# Patient Record
Sex: MALE | Race: WHITE | NOT HISPANIC OR LATINO | URBAN - NONMETROPOLITAN AREA
[De-identification: names, ages, dates, MRNs, and addresses within clinical notes are randomized per-mention and may not be internally consistent; named-entity substitution may affect disease eponyms.]

---

## 2017-08-16 ENCOUNTER — OFFICE VISIT (OUTPATIENT)
Dept: UROLOGY | Facility: CLINIC | Age: 52
End: 2017-08-16

## 2017-08-16 DIAGNOSIS — N52.02 CORPORO-VENOUS OCCLUSIVE ERECTILE DYSFUNCTION: ICD-10-CM

## 2017-08-16 DIAGNOSIS — R79.89 LOW TESTOSTERONE: Primary | ICD-10-CM

## 2017-08-16 LAB
DEPRECATED RDW RBC AUTO: 41.8 FL (ref 37–54)
ERYTHROCYTE [DISTWIDTH] IN BLOOD BY AUTOMATED COUNT: 13.1 % (ref 11.5–14.5)
HCT VFR BLD AUTO: 43.6 % (ref 42–52)
HGB BLD-MCNC: 14.7 G/DL (ref 14–18)
MCH RBC QN AUTO: 29.4 PG (ref 27–33)
MCHC RBC AUTO-ENTMCNC: 33.7 G/DL (ref 33–37)
MCV RBC AUTO: 87.2 FL (ref 80–94)
PLATELET # BLD AUTO: 279 10*3/MM3 (ref 130–400)
PMV BLD AUTO: 10.5 FL (ref 6–10)
PSA SERPL-MCNC: 0.48 NG/ML (ref 0–4)
RBC # BLD AUTO: 5 10*6/MM3 (ref 4.7–6.1)
TESTOST SERPL-MCNC: 204.04 NG/DL (ref 86.98–780.1)
WBC NRBC COR # BLD: 9.81 10*3/MM3 (ref 4.5–12.5)

## 2017-08-16 PROCEDURE — 99214 OFFICE O/P EST MOD 30 MIN: CPT | Performed by: UROLOGY

## 2017-08-16 PROCEDURE — 84153 ASSAY OF PSA TOTAL: CPT | Performed by: UROLOGY

## 2017-08-16 PROCEDURE — 84403 ASSAY OF TOTAL TESTOSTERONE: CPT | Performed by: UROLOGY

## 2017-08-16 PROCEDURE — 36415 COLL VENOUS BLD VENIPUNCTURE: CPT | Performed by: UROLOGY

## 2017-08-16 PROCEDURE — 85027 COMPLETE CBC AUTOMATED: CPT | Performed by: UROLOGY

## 2017-08-16 RX ORDER — TESTOSTERONE CYPIONATE 200 MG/ML
INJECTION, SOLUTION INTRAMUSCULAR
Qty: 10 ML | Refills: 2 | Status: SHIPPED | OUTPATIENT
Start: 2017-08-16 | End: 2017-12-07 | Stop reason: SDUPTHER

## 2017-08-16 RX ORDER — AMLODIPINE BESYLATE 10 MG/1
TABLET ORAL
COMMUNITY
Start: 2017-07-03

## 2017-08-16 RX ORDER — ALLOPURINOL 100 MG/1
TABLET ORAL
COMMUNITY
Start: 2017-07-03

## 2017-08-16 RX ORDER — GLIMEPIRIDE 2 MG/1
TABLET ORAL
COMMUNITY
Start: 2017-07-03

## 2017-08-16 RX ORDER — ATENOLOL 50 MG/1
TABLET ORAL
COMMUNITY
Start: 2017-07-03

## 2017-08-16 RX ORDER — SILDENAFIL CITRATE 20 MG/1
TABLET ORAL
Qty: 24 TABLET | Refills: 11 | Status: SHIPPED | OUTPATIENT
Start: 2017-08-16 | End: 2017-12-07 | Stop reason: SDUPTHER

## 2017-08-16 RX ORDER — LOSARTAN POTASSIUM 100 MG/1
TABLET ORAL
COMMUNITY
Start: 2017-07-03

## 2017-08-16 RX ORDER — LIRAGLUTIDE 6 MG/ML
INJECTION SUBCUTANEOUS
COMMUNITY
Start: 2017-07-03

## 2017-08-16 RX ORDER — SIMVASTATIN 10 MG
TABLET ORAL
COMMUNITY
Start: 2017-07-03

## 2017-08-16 RX ORDER — INSULIN DETEMIR 100 [IU]/ML
INJECTION, SOLUTION SUBCUTANEOUS
COMMUNITY
Start: 2017-07-11

## 2017-08-16 NOTE — PROGRESS NOTES
Chief Complaint:          Chief Complaint   Patient presents with   • Hypogonadism       HPI:   51 y.o. male.    51-year-old white male with a history of low testosterone and erectile dysfunction and uses testosterone and generic sildenafil.  He's doing great.Low testosterone-patient is here for follow-up.  Since beginning the medication he's been very pleased.  He reports a dramatic improvement in his erections, ability to achieve and maintain an erection, improvement in libido, increase in frequency of morning erections, a noticeable weight loss consistent with the treatment.  No development of breast problems or abnormalities.  He's going to have appropriate safety laboratory parameters checked.   He understands that the new data implicates testosterone with the development of prostate cancer and this is all but been disproven and the medical literature as well as the risks of cardiovascular disease which is actually also been disproven.  He understands that while he is a candidate for topical therapy if he is in contact with children this is not an option because it's been shown to accentuate genitalia development at an early age that this frequently irreversible.  He also understands this is a controlled substance and as such will not be prescribed without appropriate follow-up and appropriate laboratory investigation.  He understands effects on spermatogenesis including the fact that this is not always completely reversible and not always completely limited his ability to father a child.  He has demonstrated facility in the technique of both intramuscular and subcutaneous injection.  And has been taught sterility one drawing up the medication.      Past Medical History:        Past Medical History:   Diagnosis Date   • Diabetes mellitus    • Hypertension    • Hypogonadism in male          Current Meds:     Current Outpatient Prescriptions   Medication Sig Dispense Refill   • allopurinol (ZYLOPRIM) 100 MG  tablet      • amLODIPine (NORVASC) 10 MG tablet      • atenolol (TENORMIN) 50 MG tablet      • glimepiride (AMARYL) 2 MG tablet      • LEVEMIR FLEXTOUCH 100 UNIT/ML injection      • losartan (COZAAR) 100 MG tablet      • metFORMIN (GLUCOPHAGE) 1000 MG tablet      • NOVOFINE 32G X 6 MM misc      • simvastatin (ZOCOR) 10 MG tablet      • VICTOZA 18 MG/3ML solution pen-injector        No current facility-administered medications for this visit.         Allergies:      Allergies   Allergen Reactions   • Penicillins         Past Surgical History:     Past Surgical History:   Procedure Laterality Date   • HERNIA REPAIR           Social History:     Social History     Social History   • Marital status: Unknown     Spouse name: N/A   • Number of children: N/A   • Years of education: N/A     Occupational History   • Not on file.     Social History Main Topics   • Smoking status: Never Smoker   • Smokeless tobacco: Not on file   • Alcohol use Yes   • Drug use: No   • Sexual activity: Not on file     Other Topics Concern   • Not on file     Social History Narrative   • No narrative on file       Family History:     Family History   Problem Relation Age of Onset   • No Known Problems Father    • No Known Problems Mother        Review of Systems:     Review of Systems   Constitutional: Negative.    HENT: Negative.    Eyes: Negative.    Respiratory: Negative.    Cardiovascular: Negative.    Gastrointestinal: Negative.    Endocrine: Negative.    Musculoskeletal: Negative.    Allergic/Immunologic: Negative.    Neurological: Negative.    Hematological: Negative.    Psychiatric/Behavioral: Negative.        Physical Exam:     Physical Exam   Constitutional: He is oriented to person, place, and time. He appears well-developed and well-nourished.   HENT:   Head: Normocephalic and atraumatic.   Eyes: Conjunctivae and EOM are normal. Pupils are equal, round, and reactive to light.   Neck: Normal range of motion.   Cardiovascular: Normal  rate, regular rhythm, normal heart sounds and intact distal pulses.    Pulmonary/Chest: Effort normal and breath sounds normal.   Abdominal: Soft. Bowel sounds are normal.   Genitourinary: Rectum normal, prostate normal and penis normal.   Genitourinary Comments: Soft nontender abdomen with no organomegaly, rigidity, or tenderness.  He has normal external genitalia and uncircumcised phallus with a freely movable foreskin bilaterally descended testes without masses there is no inguinal hernias adenopathy or abnormalities he had good rectal tone and a large smooth firm prostate.  There is no nodularity or any suspicious rectal abnormalities     Musculoskeletal: Normal range of motion.   Neurological: He is alert and oriented to person, place, and time. He has normal reflexes.   Skin: Skin is warm and dry.   Psychiatric: He has a normal mood and affect. His behavior is normal. Judgment and thought content normal.   Nursing note and vitals reviewed.      Procedure:       Assessment:   No diagnosis found.  No orders of the defined types were placed in this encounter.      Plan:   Low testosterone-patient is here for follow-up.  Since beginning the medication he's been very pleased.  He reports a dramatic improvement in his erections, ability to achieve and maintain an erection, improvement in libido, increase in frequency of morning erections, a noticeable weight loss consistent with the treatment.  No development of breast problems or abnormalities.  He's going to have appropriate safety laboratory parameters checked.   He understands that the new data implicates testosterone with the development of prostate cancer and this is all but been disproven and the medical literature as well as the risks of cardiovascular disease which is actually also been disproven.  He understands that while he is a candidate for topical therapy if he is in contact with children this is not an option because it's been shown to accentuate  genitalia development at an early age that this frequently irreversible.  He also understands this is a controlled substance and as such will not be prescribed without appropriate follow-up and appropriate laboratory investigation.  He understands effects on spermatogenesis including the fact that this is not always completely reversible and not always completely limited his ability to father a child.  He has demonstrated facility in the technique of both intramuscular and subcutaneous injection.  And has been taught sterility one drawing up the medication.  ED-we discussed the anatomy and physiology of the penis and the endothelium.  We discussed the various treatment options available including oral medication and its various forms.  Penile injections, vacuum erection device and surgical intervention reserved for only the most severe cases.  We discussed the need for testosterone and about 20% of cases of erectile dysfunction.  I refilled his generic sildenafil           This document has been electronically signed by MIA MARES MD August 16, 2017 3:58 PM

## 2017-08-17 PROBLEM — N52.02 CORPORO-VENOUS OCCLUSIVE ERECTILE DYSFUNCTION: Status: ACTIVE | Noted: 2017-08-17

## 2017-08-17 PROBLEM — R79.89 LOW TESTOSTERONE: Status: ACTIVE | Noted: 2017-08-17

## 2017-12-07 ENCOUNTER — OFFICE VISIT (OUTPATIENT)
Dept: UROLOGY | Facility: CLINIC | Age: 52
End: 2017-12-07

## 2017-12-07 VITALS — BODY MASS INDEX: 37.89 KG/M2 | HEIGHT: 68 IN | WEIGHT: 250 LBS

## 2017-12-07 DIAGNOSIS — R79.89 LOW TESTOSTERONE: ICD-10-CM

## 2017-12-07 DIAGNOSIS — N42.9 DISORDER OF PROSTATE: Primary | ICD-10-CM

## 2017-12-07 LAB
DEPRECATED RDW RBC AUTO: 40.2 FL (ref 37–54)
ERYTHROCYTE [DISTWIDTH] IN BLOOD BY AUTOMATED COUNT: 12.7 % (ref 11.5–14.5)
HCT VFR BLD AUTO: 45.6 % (ref 42–52)
HGB BLD-MCNC: 15.1 G/DL (ref 14–18)
MCH RBC QN AUTO: 28.9 PG (ref 27–33)
MCHC RBC AUTO-ENTMCNC: 33.1 G/DL (ref 33–37)
MCV RBC AUTO: 87.4 FL (ref 80–94)
PLATELET # BLD AUTO: 310 10*3/MM3 (ref 130–400)
PMV BLD AUTO: 10.8 FL (ref 6–10)
PSA SERPL-MCNC: 0.57 NG/ML (ref 0–4)
RBC # BLD AUTO: 5.22 10*6/MM3 (ref 4.7–6.1)
TESTOST SERPL-MCNC: 884.81 NG/DL (ref 86.98–780.1)
WBC NRBC COR # BLD: 8.88 10*3/MM3 (ref 4.5–12.5)

## 2017-12-07 PROCEDURE — 84403 ASSAY OF TOTAL TESTOSTERONE: CPT | Performed by: UROLOGY

## 2017-12-07 PROCEDURE — 99213 OFFICE O/P EST LOW 20 MIN: CPT | Performed by: UROLOGY

## 2017-12-07 PROCEDURE — 85027 COMPLETE CBC AUTOMATED: CPT | Performed by: UROLOGY

## 2017-12-07 PROCEDURE — 84153 ASSAY OF PSA TOTAL: CPT | Performed by: UROLOGY

## 2017-12-07 RX ORDER — SILDENAFIL CITRATE 20 MG/1
TABLET ORAL
Qty: 60 TABLET | Refills: 11 | Status: SHIPPED | OUTPATIENT
Start: 2017-12-07 | End: 2018-06-21 | Stop reason: SDUPTHER

## 2017-12-07 RX ORDER — TESTOSTERONE CYPIONATE 200 MG/ML
INJECTION, SOLUTION INTRAMUSCULAR
Qty: 10 ML | Refills: 2 | Status: SHIPPED | OUTPATIENT
Start: 2017-12-07 | End: 2018-06-21 | Stop reason: SDUPTHER

## 2017-12-08 NOTE — PROGRESS NOTES
"Chief Complaint:          Chief Complaint   Patient presents with   • Hypogonadism       HPI:   52 y.o. male.  Patient returns today for follow-up.  He is been on testosterone replacement therapy.  He reports a dramatic improvement in his Yvan questionnaire: -YVAN-androgen deficiency in the age male questionnaire  The patient was queried regarding the androgen deficiency in the age male questionnaire.  This is a validated questionnaire that was performed on a set of 314 [place] male physicians when it was positive it correlated directly with a 94% chance of low testosterone.  Patient indicates there is a decrease in libido or sex drive, a lack of energy, Decreased  strength and endurance, a decreased \"enjoyment of life\", sad and grumpy feelings with significant difficulty maintaining erections.  He is also been a recent deterioration regarding work performance.  He reports weight loss.  He has good facility and the use of subcutaneous and intramuscular injections as well as comfort level and using the medication in a sterile fashion.  He understands he should use only the prescribed dose.  He's here for appropriate lab monitoring regarding this.  He understands this is a controlled substance and therefore must be watched closely will not be refilled and the medical loss or miss calculation of the dose.  He is very happy with the treatment and therefore wants to continue it.    Past Medical History:        Past Medical History:   Diagnosis Date   • Diabetes mellitus    • Hypertension    • Hypogonadism in male          Current Meds:     Current Outpatient Prescriptions   Medication Sig Dispense Refill   • allopurinol (ZYLOPRIM) 100 MG tablet      • amLODIPine (NORVASC) 10 MG tablet      • atenolol (TENORMIN) 50 MG tablet      • glimepiride (AMARYL) 2 MG tablet      • LEVEMIR FLEXTOUCH 100 UNIT/ML injection      • losartan (COZAAR) 100 MG tablet      • metFORMIN (GLUCOPHAGE) 1000 MG tablet      • NOVOFINE 32G X 6 MM " misc      • sildenafil (REVATIO) 20 MG tablet 1-5 by mouth one hour prior to intercourse on an empty stomach 60 tablet 11   • simvastatin (ZOCOR) 10 MG tablet      • Syringe, Disposable, 3 ML misc Use 3 ml syringe with a 25 gauge  5/8 inch needle 24 each 6   • Testosterone Cypionate (DEPO-TESTOSTERONE) 200 MG/ML injection He is to use 1/2 cc every Monday and Thursday SQ 10 mL 2   • VICTOZA 18 MG/3ML solution pen-injector        No current facility-administered medications for this visit.         Allergies:      Allergies   Allergen Reactions   • Penicillins         Past Surgical History:     Past Surgical History:   Procedure Laterality Date   • HERNIA REPAIR           Social History:     Social History     Social History   • Marital status: Unknown     Spouse name: N/A   • Number of children: N/A   • Years of education: N/A     Occupational History   • Not on file.     Social History Main Topics   • Smoking status: Never Smoker   • Smokeless tobacco: Not on file   • Alcohol use Yes   • Drug use: No   • Sexual activity: Not on file     Other Topics Concern   • Not on file     Social History Narrative       Family History:     Family History   Problem Relation Age of Onset   • No Known Problems Father    • No Known Problems Mother        Review of Systems:     Review of Systems   Constitutional: Negative.    HENT: Negative.    Eyes: Negative.    Respiratory: Negative.    Cardiovascular: Negative.    Gastrointestinal: Negative.    Endocrine: Negative.    Musculoskeletal: Negative.    Allergic/Immunologic: Negative.    Neurological: Negative.    Hematological: Negative.    Psychiatric/Behavioral: Negative.        Physical Exam:     Physical Exam   Constitutional: He is oriented to person, place, and time. He appears well-developed and well-nourished.   HENT:   Head: Normocephalic and atraumatic.   Eyes: Conjunctivae and EOM are normal. Pupils are equal, round, and reactive to light.   Neck: Normal range of motion.    Cardiovascular: Normal rate, regular rhythm, normal heart sounds and intact distal pulses.    Pulmonary/Chest: Effort normal and breath sounds normal.   Abdominal: Soft. Bowel sounds are normal.   Genitourinary: Rectum normal, prostate normal and penis normal.   Musculoskeletal: Normal range of motion.   Neurological: He is alert and oriented to person, place, and time. He has normal reflexes.   Skin: Skin is warm and dry.   Psychiatric: He has a normal mood and affect. His behavior is normal. Judgment and thought content normal.   Nursing note and vitals reviewed.      Procedure:       Assessment:     Encounter Diagnoses   Name Primary?   • Low testosterone    • Disorder of prostate Yes     Orders Placed This Encounter   Procedures   • CBC (No Diff)   • PSA   • Testosterone       Plan:   -Low testosterone: patient is here for follow-up.  Since beginning the medication he's been very pleased.  He reports a dramatic improvement in his erections, ability to achieve and maintain an erection, improvement in libido, increase in frequency of morning erections, a noticeable weight loss consistent with the treatment.  No development of breast problems or abnormalities.  He's going to have appropriate safety laboratory parameters checked.   He understands that the new data implicates testosterone with the development of prostate cancer and this is all but been disproven and the medical literature as well as the risks of cardiovascular disease which is actually also been disproven.  He understands that while he is a candidate for topical therapy if he is in contact with children this is not an option because it's been shown to accentuate genitalia development at an early age that this frequently irreversible.  He also understands this is a controlled substance and as such will not be prescribed without appropriate follow-up and appropriate laboratory investigation.  He understands effects on spermatogenesis including the  fact that this is not always completely reversible and not always completely limited his ability to father a child.  He has demonstrated facility in the technique of both intramuscular and subcutaneous injection.  And has been taught sterility one drawing up the medication.           This document has been electronically signed by MIA MARES MD December 8, 2017 9:29 AM

## 2018-06-21 ENCOUNTER — OFFICE VISIT (OUTPATIENT)
Dept: UROLOGY | Facility: CLINIC | Age: 53
End: 2018-06-21

## 2018-06-21 VITALS — WEIGHT: 250 LBS | HEIGHT: 68 IN | BODY MASS INDEX: 37.89 KG/M2

## 2018-06-21 DIAGNOSIS — R79.89 LOW TESTOSTERONE: ICD-10-CM

## 2018-06-21 DIAGNOSIS — N52.02 CORPORO-VENOUS OCCLUSIVE ERECTILE DYSFUNCTION: ICD-10-CM

## 2018-06-21 DIAGNOSIS — E29.1 HYPOGONADISM IN MALE: Primary | ICD-10-CM

## 2018-06-21 LAB
BASOPHILS # BLD AUTO: 0.09 10*3/MM3 (ref 0–0.3)
BASOPHILS NFR BLD AUTO: 1 % (ref 0–2)
DEPRECATED RDW RBC AUTO: 39.2 FL (ref 37–54)
EOSINOPHIL # BLD AUTO: 0.24 10*3/MM3 (ref 0–0.7)
EOSINOPHIL NFR BLD AUTO: 2.7 % (ref 0–5)
ERYTHROCYTE [DISTWIDTH] IN BLOOD BY AUTOMATED COUNT: 12.8 % (ref 11.5–14.5)
HCT VFR BLD AUTO: 42.6 % (ref 42–52)
HGB BLD-MCNC: 15.3 G/DL (ref 14–18)
IMM GRANULOCYTES # BLD: 0.02 10*3/MM3 (ref 0–0.03)
IMM GRANULOCYTES NFR BLD: 0.2 % (ref 0–0.5)
LYMPHOCYTES # BLD AUTO: 2.82 10*3/MM3 (ref 1–3)
LYMPHOCYTES NFR BLD AUTO: 32.2 % (ref 21–51)
MCH RBC QN AUTO: 30.3 PG (ref 27–33)
MCHC RBC AUTO-ENTMCNC: 35.9 G/DL (ref 33–37)
MCV RBC AUTO: 84.4 FL (ref 80–94)
MONOCYTES # BLD AUTO: 1.1 10*3/MM3 (ref 0.1–0.9)
MONOCYTES NFR BLD AUTO: 12.6 % (ref 0–10)
NEUTROPHILS # BLD AUTO: 4.48 10*3/MM3 (ref 1.4–6.5)
NEUTROPHILS NFR BLD AUTO: 51.3 % (ref 30–70)
PLATELET # BLD AUTO: 248 10*3/MM3 (ref 130–400)
PMV BLD AUTO: 10.5 FL (ref 6–10)
PSA SERPL-MCNC: 0.4 NG/ML (ref 0–4)
RBC # BLD AUTO: 5.05 10*6/MM3 (ref 4.7–6.1)
TESTOST SERPL-MCNC: 177.88 NG/DL (ref 86.98–780.1)
WBC NRBC COR # BLD: 8.75 10*3/MM3 (ref 4.5–12.5)

## 2018-06-21 PROCEDURE — 36415 COLL VENOUS BLD VENIPUNCTURE: CPT | Performed by: UROLOGY

## 2018-06-21 PROCEDURE — 99213 OFFICE O/P EST LOW 20 MIN: CPT | Performed by: UROLOGY

## 2018-06-21 PROCEDURE — 84153 ASSAY OF PSA TOTAL: CPT | Performed by: UROLOGY

## 2018-06-21 PROCEDURE — 85025 COMPLETE CBC W/AUTO DIFF WBC: CPT | Performed by: UROLOGY

## 2018-06-21 PROCEDURE — 84403 ASSAY OF TOTAL TESTOSTERONE: CPT | Performed by: UROLOGY

## 2018-06-21 RX ORDER — SILDENAFIL CITRATE 20 MG/1
TABLET ORAL
Qty: 60 TABLET | Refills: 11 | Status: SHIPPED | OUTPATIENT
Start: 2018-06-21 | End: 2019-01-10 | Stop reason: SDUPTHER

## 2018-06-21 RX ORDER — TESTOSTERONE CYPIONATE 200 MG/ML
INJECTION, SOLUTION INTRAMUSCULAR
Qty: 10 ML | Refills: 2 | Status: SHIPPED | OUTPATIENT
Start: 2018-06-21 | End: 2019-01-10 | Stop reason: SDUPTHER

## 2018-06-21 NOTE — PROGRESS NOTES
"Chief Complaint:          Chief Complaint   Patient presents with   • Hypogonadism       HPI:   52 y.o. male.  52-year-old white male who is very happy with the treatment.  He has good erections and ejaculations.  He's lost 15 pounds.  He would like to continue the therapy. Patient returns today for follow-up.  He is been on testosterone replacement therapy.  He reports a dramatic improvement in his Yvan questionnaire: -YVAN-androgen deficiency in the age male questionnaire  The patient was queried regarding the androgen deficiency in the age male questionnaire.  This is a validated questionnaire that was performed on a set of 314 [place] male physicians when it was positive it correlated directly with a 94% chance of low testosterone.  Patient indicates there is a decrease in libido or sex drive, a lack of energy, Decreased  strength and endurance, a decreased \"enjoyment of life\", sad and grumpy feelings with significant difficulty maintaining erections.  He is also been a recent deterioration regarding work performance.  He reports weight loss.  He has good facility and the use of subcutaneous and intramuscular injections as well as comfort level and using the medication in a sterile fashion.  He understands he should use only the prescribed dose.  He's here for appropriate lab monitoring regarding this.  He understands this is a controlled substance and therefore must be watched closely will not be refilled and the medical loss or miss calculation of the dose.  He is very happy with the treatment and therefore wants to continue it.    Past Medical History:        Past Medical History:   Diagnosis Date   • Diabetes mellitus    • Hypertension    • Hypogonadism in male          Current Meds:     Current Outpatient Prescriptions   Medication Sig Dispense Refill   • allopurinol (ZYLOPRIM) 100 MG tablet      • amLODIPine (NORVASC) 10 MG tablet      • atenolol (TENORMIN) 50 MG tablet      • glimepiride (AMARYL) 2 MG " tablet      • LEVEMIR FLEXTOUCH 100 UNIT/ML injection      • losartan (COZAAR) 100 MG tablet      • metFORMIN (GLUCOPHAGE) 1000 MG tablet      • NOVOFINE 32G X 6 MM misc      • sildenafil (REVATIO) 20 MG tablet 1-5 by mouth one hour prior to intercourse on an empty stomach 60 tablet 11   • simvastatin (ZOCOR) 10 MG tablet      • Syringe, Disposable, 3 ML misc Use 3 ml syringe with a 25 gauge  5/8 inch needle 24 each 6   • Testosterone Cypionate (DEPO-TESTOSTERONE) 200 MG/ML injection He is to use 1/2 cc every Monday and Thursday SQ 10 mL 2   • VICTOZA 18 MG/3ML solution pen-injector        No current facility-administered medications for this visit.         Allergies:      Allergies   Allergen Reactions   • Penicillins         Past Surgical History:     Past Surgical History:   Procedure Laterality Date   • HERNIA REPAIR           Social History:     Social History     Social History   • Marital status: Unknown     Spouse name: N/A   • Number of children: N/A   • Years of education: N/A     Occupational History   • Not on file.     Social History Main Topics   • Smoking status: Never Smoker   • Smokeless tobacco: Never Used   • Alcohol use Yes   • Drug use: No   • Sexual activity: Not on file     Other Topics Concern   • Not on file     Social History Narrative   • No narrative on file       Family History:     Family History   Problem Relation Age of Onset   • No Known Problems Father    • No Known Problems Mother        Review of Systems:     Review of Systems   Constitutional: Negative.    HENT: Negative.    Eyes: Negative.    Respiratory: Negative.    Cardiovascular: Negative.    Gastrointestinal: Negative.    Endocrine: Negative.    Musculoskeletal: Negative.    Allergic/Immunologic: Negative.    Neurological: Negative.    Hematological: Negative.    Psychiatric/Behavioral: Negative.        Physical Exam:     Physical Exam   Constitutional: He is oriented to person, place, and time. He appears well-developed  and well-nourished.   HENT:   Head: Normocephalic and atraumatic.   Eyes: Conjunctivae and EOM are normal. Pupils are equal, round, and reactive to light.   Neck: Normal range of motion.   Cardiovascular: Normal rate, regular rhythm, normal heart sounds and intact distal pulses.    Pulmonary/Chest: Effort normal and breath sounds normal.   Abdominal: Soft. Bowel sounds are normal.   Musculoskeletal: Normal range of motion.   Neurological: He is alert and oriented to person, place, and time. He has normal reflexes.   Skin: Skin is warm and dry.   Psychiatric: He has a normal mood and affect. His behavior is normal. Judgment and thought content normal.   Nursing note and vitals reviewed.      I have reviewed the following portions of the patient's history: allergies, current medications, past family history, past medical history, past social history, past surgical history, problem list and ROS and confirm it's accurate.    Procedure:       Assessment/Plan:   -Low testosterone: patient is here for follow-up.  Since beginning the medication he's been very pleased.  He reports a dramatic improvement in his erections, ability to achieve and maintain an erection, improvement in libido, increase in frequency of morning erections, a noticeable weight loss consistent with the treatment.  No development of breast problems or abnormalities.  He's going to have appropriate safety laboratory parameters checked.   He understands that the new data implicates testosterone with the development of prostate cancer and this is all but been disproven and the medical literature as well as the risks of cardiovascular disease which is actually also been disproven.  He understands that while he is a candidate for topical therapy if he is in contact with children this is not an option because it's been shown to accentuate genitalia development at an early age that this frequently irreversible.  He also understands this is a controlled  substance and as such will not be prescribed without appropriate follow-up and appropriate laboratory investigation.  He understands effects on spermatogenesis including the fact that this is not always completely reversible and not always completely limited his ability to father a child.  He has demonstrated facility in the technique of both intramuscular and subcutaneous injection.  And has been taught sterility one drawing up the medication.     Patient's Body mass index is 38.02 kg/m². BMI is above normal parameters. Recommendations include: educational material.          This document has been electronically signed by MIA MARES MD June 21, 2018 4:05 PM

## 2018-06-22 ENCOUNTER — TELEPHONE (OUTPATIENT)
Dept: UROLOGY | Facility: CLINIC | Age: 53
End: 2018-06-22

## 2018-06-22 NOTE — TELEPHONE ENCOUNTER
I called the patient to let him know that we got the results of his testosterone levels and Dr. Arevalo said it look great!!

## 2019-01-10 ENCOUNTER — OFFICE VISIT (OUTPATIENT)
Dept: UROLOGY | Facility: CLINIC | Age: 54
End: 2019-01-10

## 2019-01-10 VITALS — WEIGHT: 250 LBS | HEIGHT: 68 IN | BODY MASS INDEX: 37.89 KG/M2

## 2019-01-10 DIAGNOSIS — N52.02 CORPORO-VENOUS OCCLUSIVE ERECTILE DYSFUNCTION: ICD-10-CM

## 2019-01-10 DIAGNOSIS — R79.89 LOW TESTOSTERONE: ICD-10-CM

## 2019-01-10 DIAGNOSIS — E29.1 HYPOGONADISM IN MALE: Primary | ICD-10-CM

## 2019-01-10 LAB
DEPRECATED RDW RBC AUTO: 41 FL (ref 37–54)
ERYTHROCYTE [DISTWIDTH] IN BLOOD BY AUTOMATED COUNT: 13.2 % (ref 11.5–14.5)
HCT VFR BLD AUTO: 44.2 % (ref 42–52)
HGB BLD-MCNC: 15.1 G/DL (ref 14–18)
MCH RBC QN AUTO: 29.8 PG (ref 27–33)
MCHC RBC AUTO-ENTMCNC: 34.2 G/DL (ref 33–37)
MCV RBC AUTO: 87.2 FL (ref 80–94)
PLATELET # BLD AUTO: 318 10*3/MM3 (ref 130–400)
PMV BLD AUTO: 10.3 FL (ref 6–10)
PSA SERPL-MCNC: 0.9 NG/ML (ref 0–4)
RBC # BLD AUTO: 5.07 10*6/MM3 (ref 4.7–6.1)
TESTOST SERPL-MCNC: 553.57 NG/DL (ref 86.98–780.1)
WBC NRBC COR # BLD: 11.48 10*3/MM3 (ref 4.5–12.5)

## 2019-01-10 PROCEDURE — 85027 COMPLETE CBC AUTOMATED: CPT | Performed by: UROLOGY

## 2019-01-10 PROCEDURE — 99213 OFFICE O/P EST LOW 20 MIN: CPT | Performed by: UROLOGY

## 2019-01-10 PROCEDURE — 84403 ASSAY OF TOTAL TESTOSTERONE: CPT | Performed by: UROLOGY

## 2019-01-10 PROCEDURE — 84153 ASSAY OF PSA TOTAL: CPT | Performed by: UROLOGY

## 2019-01-10 PROCEDURE — 36415 COLL VENOUS BLD VENIPUNCTURE: CPT | Performed by: UROLOGY

## 2019-01-10 RX ORDER — TESTOSTERONE CYPIONATE 200 MG/ML
INJECTION, SOLUTION INTRAMUSCULAR
Qty: 10 ML | Refills: 2 | Status: SHIPPED | OUTPATIENT
Start: 2019-01-10 | End: 2019-08-29 | Stop reason: SDUPTHER

## 2019-01-10 RX ORDER — SILDENAFIL CITRATE 20 MG/1
TABLET ORAL
Qty: 60 TABLET | Refills: 11 | Status: SHIPPED | OUTPATIENT
Start: 2019-01-10 | End: 2019-08-29 | Stop reason: SDUPTHER

## 2019-01-10 NOTE — PROGRESS NOTES
"Chief Complaint:          Chief Complaint   Patient presents with   • Hypogonadism       HPI:   53 y.o. male.  53-year-old white male returns today's doing fantastic on testosterone I thought his face looks a little red and I'm to work him up for polycythemia. Patient returns today for follow-up.  He is been on testosterone replacement therapy.  He reports a dramatic improvement in his Yvan questionnaire: -YVAN-androgen deficiency in the age male questionnaire  The patient was queried regarding the androgen deficiency in the age male questionnaire.  This is a validated questionnaire that was performed on a set of 314 [place] male physicians when it was positive it correlated directly with a 94% chance of low testosterone.  Patient indicates there is a decrease in libido or sex drive, a lack of energy, Decreased  strength and endurance, a decreased \"enjoyment of life\", sad and grumpy feelings with significant difficulty maintaining erections.  He is also been a recent deterioration regarding work performance.  He reports weight loss.  He has good facility and the use of subcutaneous and intramuscular injections as well as comfort level and using the medication in a sterile fashion.  He understands he should use only the prescribed dose.  He's here for appropriate lab monitoring regarding this.  He understands this is a controlled substance and therefore must be watched closely will not be refilled and the medical loss or miss calculation of the dose.  He is very happy with the treatment and therefore wants to continue it.    Past Medical History:        Past Medical History:   Diagnosis Date   • Diabetes mellitus (CMS/[facility])    • Hypertension    • Hypogonadism in male          Current Meds:     Current Outpatient Medications   Medication Sig Dispense Refill   • allopurinol (ZYLOPRIM) 100 MG tablet      • amLODIPine (NORVASC) 10 MG tablet      • atenolol (TENORMIN) 50 MG tablet      • glimepiride (AMARYL) 2 MG tablet   "    • LEVEMIR FLEXTOUCH 100 UNIT/ML injection      • losartan (COZAAR) 100 MG tablet      • metFORMIN (GLUCOPHAGE) 1000 MG tablet      • NOVOFINE 32G X 6 MM misc      • sildenafil (REVATIO) 20 MG tablet 1-5 by mouth one hour prior to intercourse on an empty stomach 60 tablet 11   • simvastatin (ZOCOR) 10 MG tablet      • Syringe, Disposable, 3 ML misc Use 3 ml syringe with a 25 gauge  5/8 inch needle 24 each 6   • Testosterone Cypionate (DEPO-TESTOSTERONE) 200 MG/ML injection He is to use 1/2 cc every Monday and Thursday SQ 10 mL 2   • VICTOZA 18 MG/3ML solution pen-injector        No current facility-administered medications for this visit.         Allergies:      Allergies   Allergen Reactions   • Penicillins         Past Surgical History:     Past Surgical History:   Procedure Laterality Date   • HERNIA REPAIR           Social History:     Social History     Socioeconomic History   • Marital status: Unknown     Spouse name: Not on file   • Number of children: Not on file   • Years of education: Not on file   • Highest education level: Not on file   Social Needs   • Financial resource strain: Not on file   • Food insecurity - worry: Not on file   • Food insecurity - inability: Not on file   • Transportation needs - medical: Not on file   • Transportation needs - non-medical: Not on file   Occupational History   • Not on file   Tobacco Use   • Smoking status: Never Smoker   • Smokeless tobacco: Never Used   Substance and Sexual Activity   • Alcohol use: Yes   • Drug use: No   • Sexual activity: Not on file   Other Topics Concern   • Not on file   Social History Narrative   • Not on file       Family History:     Family History   Problem Relation Age of Onset   • No Known Problems Father    • No Known Problems Mother        Review of Systems:     Review of Systems   Constitutional: Negative.    HENT: Negative.    Eyes: Negative.    Respiratory: Negative.    Cardiovascular: Negative.    Gastrointestinal: Negative.     Endocrine: Negative.    Musculoskeletal: Negative.    Allergic/Immunologic: Negative.    Neurological: Negative.    Hematological: Negative.    Psychiatric/Behavioral: Negative.        Physical Exam:     Physical Exam   Constitutional: He is oriented to person, place, and time. He appears well-developed and well-nourished.   HENT:   Head: Normocephalic and atraumatic.   Florid facies   Eyes: Conjunctivae and EOM are normal. Pupils are equal, round, and reactive to light.   Neck: Normal range of motion.   Cardiovascular: Normal rate, regular rhythm, normal heart sounds and intact distal pulses.   Pulmonary/Chest: Effort normal and breath sounds normal.   Abdominal: Soft. Bowel sounds are normal.   Musculoskeletal: Normal range of motion.   Neurological: He is alert and oriented to person, place, and time. He has normal reflexes.   Skin: Skin is warm and dry.   Psychiatric: He has a normal mood and affect. His behavior is normal. Judgment and thought content normal.   Nursing note and vitals reviewed.      I have reviewed the following portions of the patient's history: allergies, current medications, past family history, past medical history, past social history, past surgical history, problem list and ROS and confirm it's accurate.      Procedure:       Assessment/Plan:   -Low testosterone: patient is here for follow-up.  Since beginning the medication he's been very pleased.  He reports a dramatic improvement in his erections, ability to achieve and maintain an erection, improvement in libido, increase in frequency of morning erections, a noticeable weight loss consistent with the treatment.  No development of breast problems or abnormalities.  He's going to have appropriate safety laboratory parameters checked.   He understands that the new data implicates testosterone with the development of prostate cancer and this is all but been disproven and the medical literature as well as the risks of cardiovascular  disease which is actually also been disproven.  He understands that while he is a candidate for topical therapy if he is in contact with children this is not an option because it's been shown to accentuate genitalia development at an early age that this frequently irreversible.  He also understands this is a controlled substance and as such will not be prescribed without appropriate follow-up and appropriate laboratory investigation.  He understands effects on spermatogenesis including the fact that this is not always completely reversible and not always completely limited his ability to father a child.  He has demonstrated facility in the technique of both intramuscular and subcutaneous injection.  And has been taught sterility one drawing up the medication.     Patient's Body mass index is 38.02 kg/m². BMI is above normal parameters. Recommendations include: educational material.          This document has been electronically signed by MIA MARES MD January 10, 2019 3:52 PM

## 2019-01-11 ENCOUNTER — TELEPHONE (OUTPATIENT)
Dept: UROLOGY | Facility: CLINIC | Age: 54
End: 2019-01-11

## 2019-08-29 ENCOUNTER — OFFICE VISIT (OUTPATIENT)
Dept: UROLOGY | Facility: CLINIC | Age: 54
End: 2019-08-29

## 2019-08-29 VITALS
BODY MASS INDEX: 39.22 KG/M2 | WEIGHT: 258.8 LBS | DIASTOLIC BLOOD PRESSURE: 80 MMHG | SYSTOLIC BLOOD PRESSURE: 120 MMHG | HEIGHT: 68 IN

## 2019-08-29 DIAGNOSIS — E28.0 HYPERESTROGENISM: ICD-10-CM

## 2019-08-29 DIAGNOSIS — R79.89 LOW TESTOSTERONE: ICD-10-CM

## 2019-08-29 DIAGNOSIS — N52.02 CORPORO-VENOUS OCCLUSIVE ERECTILE DYSFUNCTION: ICD-10-CM

## 2019-08-29 DIAGNOSIS — E29.1 HYPOGONADISM IN MALE: Primary | ICD-10-CM

## 2019-08-29 PROCEDURE — 84403 ASSAY OF TOTAL TESTOSTERONE: CPT | Performed by: UROLOGY

## 2019-08-29 PROCEDURE — 84153 ASSAY OF PSA TOTAL: CPT | Performed by: UROLOGY

## 2019-08-29 PROCEDURE — 85027 COMPLETE CBC AUTOMATED: CPT | Performed by: UROLOGY

## 2019-08-29 PROCEDURE — 82670 ASSAY OF TOTAL ESTRADIOL: CPT | Performed by: UROLOGY

## 2019-08-29 PROCEDURE — 99214 OFFICE O/P EST MOD 30 MIN: CPT | Performed by: UROLOGY

## 2019-08-29 RX ORDER — SILDENAFIL CITRATE 20 MG/1
TABLET ORAL
Qty: 60 TABLET | Refills: 11 | Status: SHIPPED | OUTPATIENT
Start: 2019-08-29 | End: 2021-03-04 | Stop reason: SDUPTHER

## 2019-08-29 RX ORDER — TESTOSTERONE CYPIONATE 200 MG/ML
INJECTION, SOLUTION INTRAMUSCULAR
Qty: 10 ML | Refills: 2 | Status: SHIPPED | OUTPATIENT
Start: 2019-08-29 | End: 2021-03-04 | Stop reason: SDUPTHER

## 2019-08-29 NOTE — PROGRESS NOTES
"Chief Complaint:          Chief Complaint   Patient presents with   • Hypogonadism in male     6 month follow up        HPI:   53 y.o. male.  Continues to do well but has stopped losing weight and is concerned.  He was doing really quite well no complaints problems burning blood discharge.  I meant to go ahead and check an estrogen level I suspect were dealing with hyperestrogenism.  Patient returns today for follow-up.  He is been on testosterone replacement therapy.  He reports a dramatic improvement in his Yvan questionnaire: -YVAN-androgen deficiency in the age male questionnaire  The patient was queried regarding the androgen deficiency in the age male questionnaire.  This is a validated questionnaire that was performed on a set of 314 [place] male physicians when it was positive it correlated directly with a 94% chance of low testosterone.  Patient indicates there is a decrease in libido or sex drive, a lack of energy, Decreased  strength and endurance, a decreased \"enjoyment of life\", sad and grumpy feelings with significant difficulty maintaining erections.  He is also been a recent deterioration regarding work performance.  He reports weight loss.  He has good facility and the use of subcutaneous and intramuscular injections as well as comfort level and using the medication in a sterile fashion.  He understands he should use only the prescribed dose.  He's here for appropriate lab monitoring regarding this.  He understands this is a controlled substance and therefore must be watched closely will not be refilled and the medical loss or miss calculation of the dose.  He is very happy with the treatment and therefore wants to continue it.  He also uses sildenafil for erectile dysfunction and requests a refill.    Past Medical History:        Past Medical History:   Diagnosis Date   • Diabetes mellitus (CMS/[facility])    • Hypertension    • Hypogonadism in male          Current Meds:     Current Outpatient " Medications   Medication Sig Dispense Refill   • allopurinol (ZYLOPRIM) 100 MG tablet      • amLODIPine (NORVASC) 10 MG tablet      • atenolol (TENORMIN) 50 MG tablet      • glimepiride (AMARYL) 2 MG tablet      • LEVEMIR FLEXTOUCH 100 UNIT/ML injection      • losartan (COZAAR) 100 MG tablet      • metFORMIN (GLUCOPHAGE) 1000 MG tablet      • NOVOFINE 32G X 6 MM misc      • sildenafil (REVATIO) 20 MG tablet 1-5 by mouth one hour prior to intercourse on an empty stomach 60 tablet 11   • simvastatin (ZOCOR) 10 MG tablet      • Syringe, Disposable, 3 ML misc Use 3 ml syringe with a 25 gauge  5/8 inch needle 24 each 6   • Testosterone Cypionate (DEPO-TESTOSTERONE) 200 MG/ML injection He is to use 1/2 cc every Monday and Thursday SQ 10 mL 2   • VICTOZA 18 MG/3ML solution pen-injector        No current facility-administered medications for this visit.         Allergies:      Allergies   Allergen Reactions   • Penicillins         Past Surgical History:     Past Surgical History:   Procedure Laterality Date   • HERNIA REPAIR           Social History:     Social History     Socioeconomic History   • Marital status: Unknown     Spouse name: Not on file   • Number of children: Not on file   • Years of education: Not on file   • Highest education level: Not on file   Tobacco Use   • Smoking status: Never Smoker   • Smokeless tobacco: Never Used   Substance and Sexual Activity   • Alcohol use: Yes   • Drug use: No       Family History:     Family History   Problem Relation Age of Onset   • No Known Problems Father    • No Known Problems Mother        Review of Systems:     Review of Systems   Constitutional: Negative.    HENT: Negative.    Eyes: Negative.    Respiratory: Negative.    Cardiovascular: Negative.    Gastrointestinal: Negative.    Endocrine: Negative.    Musculoskeletal: Negative.    Allergic/Immunologic: Negative.    Neurological: Negative.    Hematological: Negative.    Psychiatric/Behavioral: Negative.         Physical Exam:     Physical Exam   Constitutional: He is oriented to person, place, and time. He appears well-developed and well-nourished.   HENT:   Head: Normocephalic and atraumatic.   Eyes: Conjunctivae and EOM are normal. Pupils are equal, round, and reactive to light.   Neck: Normal range of motion.   Cardiovascular: Normal rate, regular rhythm, normal heart sounds and intact distal pulses.   Pulmonary/Chest: Effort normal and breath sounds normal.   Abdominal: Soft. Bowel sounds are normal.   Musculoskeletal: Normal range of motion.   Neurological: He is alert and oriented to person, place, and time. He has normal reflexes.   Skin: Skin is warm and dry.   Psychiatric: He has a normal mood and affect. His behavior is normal. Judgment and thought content normal.   Nursing note and vitals reviewed.      I have reviewed the following portions of the patient's history: allergies, current medications, past family history, past medical history, past social history, past surgical history, problem list and ROS and confirm it's accurate.      Procedure:       Assessment/Plan:   Low testosterone:  patient is here for follow-up.  Since beginning the medication he's been very pleased.  He reports a dramatic improvement in his erections, ability to achieve and maintain an erection, improvement in libido, increase in frequency of morning erections, a noticeable weight loss consistent with the treatment.  No development of breast problems or abnormalities.  He's going to have appropriate safety laboratory parameters checked.   He understands that the new data implicates testosterone with the development of prostate cancer and this is all but been disproven and the medical literature as well as the risks of cardiovascular disease which is actually also been disproven.  He understands that while he is a candidate for topical therapy if he is in contact with children this is not an option because it's been shown to  accentuate genitalia development at an early age that this frequently irreversible.  He also understands this is a controlled substance and as such will not be prescribed without appropriate follow-up and appropriate laboratory investigation.  He understands effects on spermatogenesis including the fact that this is not always completely reversible and not always completely limited his ability to father a child.  He has demonstrated facility in the technique of both intramuscular and subcutaneous injection.  And has been taught sterility one drawing up the medication.  Hyperestrogenism-we spoke about the role of estrogen metabolism and breakdown in the  presence of testosterone replacement therapy.  We spoke about how high estradiol levels can interfere with the improvement noted in a man on testosterone as well as significant side effects such as pseudogynecomastia.  We discussed the use of the medication arimidex using a very judicious low-dose fashion to prevent too low of an estradiol which would precipitate bone complications.  Erectile dysfunction-we discussed the anatomy and physiology of the penis and the endothelium.  We discussed the various forms of erectile dysfunction including peripheral vascular occlusive disease, postoperative, secondary to radiation treatments of the prostate, and arterial inflow.  We discussed the various treatment options available including oral medication and its various forms.  We discussed the use of both generic and non-generic Viagra.  We discussed Cialis and a longer half-life of 17 hours as well as the other 2 medications.  We discussed cost involved with this including the fact that the generic is much cheaper but is taken has multiple pills because they are 20 mg dosages.  We did discuss the other alternatives including Penile injections, vacuum erection devices and surgical intervention reserved for only the most severe cases.  We discussed the need for testosterone in  about 20% of cases of erectile dysfunction.      Patient reports that he is not currently experiencing any symptoms of urinary incontinence.          This document has been electronically signed by MIA MARES MD August 29, 2019 2:45 PM

## 2019-08-30 PROBLEM — E28.0 HYPERESTROGENISM: Status: ACTIVE | Noted: 2019-08-30

## 2019-08-30 LAB
DEPRECATED RDW RBC AUTO: 44.9 FL (ref 37–54)
ERYTHROCYTE [DISTWIDTH] IN BLOOD BY AUTOMATED COUNT: 13.3 % (ref 12.3–15.4)
ESTRADIOL SERPL HS-MCNC: 31.2 PG/ML
HCT VFR BLD AUTO: 50.6 % (ref 37.5–51)
HGB BLD-MCNC: 16.4 G/DL (ref 13–17.7)
MCH RBC QN AUTO: 30 PG (ref 26.6–33)
MCHC RBC AUTO-ENTMCNC: 32.4 G/DL (ref 31.5–35.7)
MCV RBC AUTO: 92.7 FL (ref 79–97)
PLATELET # BLD AUTO: 255 10*3/MM3 (ref 140–450)
PMV BLD AUTO: 11.1 FL (ref 6–12)
PSA SERPL-MCNC: 0.88 NG/ML (ref 0–4)
RBC # BLD AUTO: 5.46 10*6/MM3 (ref 4.14–5.8)
TESTOST SERPL-MCNC: 181 NG/DL (ref 193–740)
WBC NRBC COR # BLD: 7.79 10*3/MM3 (ref 3.4–10.8)

## 2021-03-04 ENCOUNTER — OFFICE VISIT (OUTPATIENT)
Dept: UROLOGY | Facility: CLINIC | Age: 56
End: 2021-03-04

## 2021-03-04 VITALS — WEIGHT: 230 LBS | HEIGHT: 70 IN | TEMPERATURE: 98.4 F | BODY MASS INDEX: 32.93 KG/M2

## 2021-03-04 DIAGNOSIS — R79.89 LOW TESTOSTERONE: ICD-10-CM

## 2021-03-04 DIAGNOSIS — N52.02 CORPORO-VENOUS OCCLUSIVE ERECTILE DYSFUNCTION: ICD-10-CM

## 2021-03-04 DIAGNOSIS — E28.0 HYPERESTROGENISM: ICD-10-CM

## 2021-03-04 DIAGNOSIS — N42.9 DISORDER OF PROSTATE: ICD-10-CM

## 2021-03-04 DIAGNOSIS — E29.1 HYPOGONADISM IN MALE: Primary | ICD-10-CM

## 2021-03-04 PROCEDURE — 36415 COLL VENOUS BLD VENIPUNCTURE: CPT | Performed by: UROLOGY

## 2021-03-04 PROCEDURE — 85027 COMPLETE CBC AUTOMATED: CPT | Performed by: UROLOGY

## 2021-03-04 PROCEDURE — 84403 ASSAY OF TOTAL TESTOSTERONE: CPT | Performed by: UROLOGY

## 2021-03-04 PROCEDURE — 82670 ASSAY OF TOTAL ESTRADIOL: CPT | Performed by: UROLOGY

## 2021-03-04 PROCEDURE — 84153 ASSAY OF PSA TOTAL: CPT | Performed by: UROLOGY

## 2021-03-04 PROCEDURE — 99214 OFFICE O/P EST MOD 30 MIN: CPT | Performed by: UROLOGY

## 2021-03-04 RX ORDER — TESTOSTERONE CYPIONATE 200 MG/ML
INJECTION, SOLUTION INTRAMUSCULAR
Qty: 10 ML | Refills: 2 | Status: SHIPPED | OUTPATIENT
Start: 2021-03-04

## 2021-03-04 RX ORDER — SILDENAFIL CITRATE 20 MG/1
TABLET ORAL
Qty: 60 TABLET | Refills: 11 | Status: SHIPPED | OUTPATIENT
Start: 2021-03-04

## 2021-03-04 NOTE — PROGRESS NOTES
"Chief Complaint:          Chief Complaint   Patient presents with   • low testosterone       HPI:   55 y.o. male patient returns today for follow-up.  He is been on testosterone replacement therapy.  He reports a dramatic improvement in his Yvan questionnaire: -YVAN-androgen deficiency in the age male questionnaire  The patient was queried regarding the androgen deficiency in the age male questionnaire.  This is a validated questionnaire that was performed on a set of 314 [place] male physicians when it was positive it correlated directly with a 94% chance of low testosterone.  Patient indicates there is a decrease in libido or sex drive, a lack of energy, Decreased  strength and endurance, a decreased \"enjoyment of life\", sad and grumpy feelings with significant difficulty maintaining erections.  He is also been a recent deterioration regarding work performance.  He reports weight loss.  He has good facility and the use of subcutaneous and intramuscular injections as well as comfort level and using the medication in a sterile fashion.  He understands he should use only the prescribed dose.  He's here for appropriate lab monitoring regarding this.  He understands this is a controlled substance and therefore must be watched closely will not be refilled and the medical loss or miss calculation of the dose.  He is very happy with the treatment and therefore wants to continue it.      Past Medical History:        Past Medical History:   Diagnosis Date   • Diabetes mellitus (CMS/[facility])    • Hypertension    • Hypogonadism in male          Current Meds:     Current Outpatient Medications   Medication Sig Dispense Refill   • allopurinol (ZYLOPRIM) 100 MG tablet      • amLODIPine (NORVASC) 10 MG tablet      • atenolol (TENORMIN) 50 MG tablet      • glimepiride (AMARYL) 2 MG tablet      • LEVEMIR FLEXTOUCH 100 UNIT/ML injection      • losartan (COZAAR) 100 MG tablet      • metFORMIN (GLUCOPHAGE) 1000 MG tablet      • NOVOFINE " 32G X 6 MM misc      • sildenafil (REVATIO) 20 MG tablet 1-5 by mouth one hour prior to intercourse on an empty stomach 60 tablet 11   • simvastatin (ZOCOR) 10 MG tablet      • Syringe, Disposable, 3 ML misc Use 3 ml syringe with a 25 gauge  5/8 inch needle 24 each 6   • Testosterone Cypionate (DEPO-TESTOSTERONE) 200 MG/ML injection He is to use 1/2 cc every Monday and Thursday SQ 10 mL 2   • VICTOZA 18 MG/3ML solution pen-injector        No current facility-administered medications for this visit.         Allergies:      Allergies   Allergen Reactions   • Penicillins         Past Surgical History:     Past Surgical History:   Procedure Laterality Date   • HERNIA REPAIR           Social History:     Social History     Socioeconomic History   • Marital status: Unknown     Spouse name: Not on file   • Number of children: Not on file   • Years of education: Not on file   • Highest education level: Not on file   Tobacco Use   • Smoking status: Never Smoker   • Smokeless tobacco: Never Used   Substance and Sexual Activity   • Alcohol use: Yes   • Drug use: No       Family History:     Family History   Problem Relation Age of Onset   • No Known Problems Father    • No Known Problems Mother        Review of Systems:     Review of Systems   Constitutional: Negative.    HENT: Negative.    Eyes: Negative.    Respiratory: Negative.    Cardiovascular: Negative.    Gastrointestinal: Negative.    Endocrine: Negative.    Musculoskeletal: Negative.    Allergic/Immunologic: Negative.    Neurological: Negative.    Hematological: Negative.    Psychiatric/Behavioral: Negative.        Physical Exam:     Physical Exam  Vitals and nursing note reviewed.   Constitutional:       Appearance: He is well-developed.   HENT:      Head: Normocephalic and atraumatic.   Eyes:      Conjunctiva/sclera: Conjunctivae normal.      Pupils: Pupils are equal, round, and reactive to light.   Cardiovascular:      Rate and Rhythm: Normal rate and regular  rhythm.      Heart sounds: Normal heart sounds.   Pulmonary:      Effort: Pulmonary effort is normal.      Breath sounds: Normal breath sounds.   Abdominal:      General: Bowel sounds are normal.      Palpations: Abdomen is soft.   Musculoskeletal:         General: Normal range of motion.      Cervical back: Normal range of motion.   Skin:     General: Skin is warm and dry.   Neurological:      Mental Status: He is alert and oriented to person, place, and time.      Deep Tendon Reflexes: Reflexes are normal and symmetric.   Psychiatric:         Behavior: Behavior normal.         Thought Content: Thought content normal.         Judgment: Judgment normal.         I have reviewed the following portions of the patient's history: allergies, current medications, past family history, past medical history, past social history, past surgical history, problem list and ROS and confirm it's accurate.      Procedure:       Assessment/Plan:   Low testosterone:  patient is here for follow-up.  Since beginning the medication he's been very pleased.  He reports a dramatic improvement in his erections, ability to achieve and maintain an erection, improvement in libido, increase in frequency of morning erections, a noticeable weight loss consistent with the treatment.  No development of breast problems or abnormalities.  He's going to have appropriate safety laboratory parameters checked.   He understands that the new data implicates testosterone with the development of prostate cancer and this is all but been disproven and the medical literature as well as the risks of cardiovascular disease which is actually also been disproven.  He understands that while he is a candidate for topical therapy if he is in contact with children this is not an option because it's been shown to accentuate genitalia development at an early age that this frequently irreversible.  He also understands this is a controlled substance and as such will not be  prescribed without appropriate follow-up and appropriate laboratory investigation.  He understands effects on spermatogenesis including the fact that this is not always completely reversible and not always completely limited his ability to father a child.  He has demonstrated facility in the technique of both intramuscular and subcutaneous injection.  And has been taught sterility one drawing up the medication.  Erectile dysfunction-we discussed the anatomy and physiology of the penis and the endothelium.  We discussed the various forms of erectile dysfunction including peripheral vascular occlusive disease, postoperative, secondary to radiation treatments of the prostate, and arterial inflow.  We discussed the various treatment options available including oral medication and its various forms.  We discussed the use of both generic and non-generic Viagra.  We discussed Cialis and a longer half-life of 17 hours as well as the other 2 medications.  We discussed cost involved with this including the fact that the generic is much cheaper but is taken has multiple pills because they are 20 mg dosages.  We did discuss the other alternatives including Penile injections, vacuum erection devices and surgical intervention reserved for only the most severe cases.  We discussed the need for testosterone in about 20% of cases of erectile dysfunction.  Continue Cialis  Hyperestrogenism-we spoke about the role of estrogen metabolism and breakdown in the  presence of testosterone replacement therapy.  We spoke about how high estradiol levels can interfere with the improvement noted in a man on testosterone as well as significant side effects such as pseudogynecomastia.  We discussed the use of the medication arimidex using a very judicious low-dose fashion to prevent too low of an estradiol which would precipitate bone complications.  Going to check an estradiol level  Polycythemia-I am going to check a CBC to rule out hemoglobin  changes.  We utilized the American Heart Association guidelines for polycythemia which is a hemoglobin greater than 18 and a hematocrit greater than 54.5.  Recommend therapeutic phlebotomy as the treatment.  It is important that we indicate that is the most likely cause of the polycythemia.  We also discussed the possibility of decreasing the dose of testosterone.            Patient's Body mass index is 33 kg/m². BMI is above normal parameters. Recommendations include: educational material.              This document has been electronically signed by MIA MARES MD March 4, 2021 14:09 EST

## 2021-03-05 LAB
DEPRECATED RDW RBC AUTO: 41.3 FL (ref 37–54)
ERYTHROCYTE [DISTWIDTH] IN BLOOD BY AUTOMATED COUNT: 13.2 % (ref 12.3–15.4)
ESTRADIOL SERPL HS-MCNC: 23.1 PG/ML
HCT VFR BLD AUTO: 44.3 % (ref 37.5–51)
HGB BLD-MCNC: 14.8 G/DL (ref 13–17.7)
MCH RBC QN AUTO: 28.7 PG (ref 26.6–33)
MCHC RBC AUTO-ENTMCNC: 33.4 G/DL (ref 31.5–35.7)
MCV RBC AUTO: 86 FL (ref 79–97)
PLATELET # BLD AUTO: 320 10*3/MM3 (ref 140–450)
PMV BLD AUTO: 10.5 FL (ref 6–12)
PSA SERPL-MCNC: 1.13 NG/ML (ref 0–4)
RBC # BLD AUTO: 5.15 10*6/MM3 (ref 4.14–5.8)
TESTOST SERPL-MCNC: 286 NG/DL (ref 193–740)
WBC # BLD AUTO: 6.73 10*3/MM3 (ref 3.4–10.8)

## 2021-04-06 ENCOUNTER — TELEPHONE (OUTPATIENT)
Dept: UROLOGY | Facility: CLINIC | Age: 56
End: 2021-04-06

## 2021-04-07 NOTE — TELEPHONE ENCOUNTER
Unable to initiate the PA, most insurance companies do not authorize this medication for the indications specified, tried to notify the pt.

## 2021-04-08 ENCOUNTER — TELEPHONE (OUTPATIENT)
Dept: UROLOGY | Facility: CLINIC | Age: 56
End: 2021-04-08

## 2021-04-08 NOTE — TELEPHONE ENCOUNTER
SINCE INSURANCE WILL NOT APPROVE SILDENAFIL IS THERE SOMETHING ELSE THE INSURANCE WILL APPROVE PLEASE ADVISE

## 2021-04-08 NOTE — TELEPHONE ENCOUNTER
LM - no other medications are covered for ED - explained that he could see if [facility] had a discount card or if they would transfer the med over to [facility] who runs a rate of #30 of the 20 mg Sildenafil for around $20.